# Patient Record
Sex: MALE | Race: WHITE
[De-identification: names, ages, dates, MRNs, and addresses within clinical notes are randomized per-mention and may not be internally consistent; named-entity substitution may affect disease eponyms.]

---

## 2023-10-06 ENCOUNTER — HOSPITAL ENCOUNTER (OUTPATIENT)
Dept: HOSPITAL 47 - ORWHC2ENDO | Age: 38
End: 2023-10-06
Attending: INTERNAL MEDICINE
Payer: COMMERCIAL

## 2023-10-06 VITALS — DIASTOLIC BLOOD PRESSURE: 96 MMHG | HEART RATE: 71 BPM | RESPIRATION RATE: 14 BRPM | SYSTOLIC BLOOD PRESSURE: 144 MMHG

## 2023-10-06 VITALS — TEMPERATURE: 97 F

## 2023-10-06 DIAGNOSIS — K21.9: ICD-10-CM

## 2023-10-06 DIAGNOSIS — D12.3: Primary | ICD-10-CM

## 2023-10-06 DIAGNOSIS — Z87.891: ICD-10-CM

## 2023-10-06 DIAGNOSIS — K29.50: ICD-10-CM

## 2023-10-06 DIAGNOSIS — K62.5: ICD-10-CM

## 2023-10-06 DIAGNOSIS — Z79.899: ICD-10-CM

## 2023-10-06 LAB
ALBUMIN SERPL-MCNC: 4.2 G/DL (ref 3.5–5)
ALP SERPL-CCNC: 78 U/L (ref 38–126)
ALT SERPL-CCNC: 43 U/L (ref 4–49)
ANION GAP SERPL CALC-SCNC: 10 MMOL/L
AST SERPL-CCNC: 26 U/L (ref 17–59)
BASOPHILS # BLD AUTO: 0 K/UL (ref 0–0.2)
BASOPHILS NFR BLD AUTO: 1 %
BUN SERPL-SCNC: 13 MG/DL (ref 9–20)
CALCIUM SPEC-MCNC: 9 MG/DL (ref 8.4–10.2)
CHLORIDE SERPL-SCNC: 101 MMOL/L (ref 98–107)
CHOLEST SERPL-MCNC: 246 MG/DL (ref 0–200)
CO2 SERPL-SCNC: 24 MMOL/L (ref 22–30)
EOSINOPHIL # BLD AUTO: 0.3 K/UL (ref 0–0.7)
EOSINOPHIL NFR BLD AUTO: 3 %
ERYTHROCYTE [DISTWIDTH] IN BLOOD BY AUTOMATED COUNT: 5.2 M/UL (ref 4.3–5.9)
ERYTHROCYTE [DISTWIDTH] IN BLOOD: 12.6 % (ref 11.5–15.5)
GLUCOSE SERPL-MCNC: 284 MG/DL (ref 74–99)
HCT VFR BLD AUTO: 45.3 % (ref 39–53)
HDLC SERPL-MCNC: 26 MG/DL (ref 40–60)
HGB BLD-MCNC: 16.2 GM/DL (ref 13–17.5)
LDLC SERPL CALC-MCNC: 120.8 MG/DL (ref 0–131)
LDLC SERPL DIRECT ASSAY-MCNC: 133 MG/DL (ref 0–129)
LYMPHOCYTES # SPEC AUTO: 2.2 K/UL (ref 1–4.8)
LYMPHOCYTES NFR SPEC AUTO: 26 %
MAGNESIUM SPEC-SCNC: 1.8 MG/DL (ref 1.6–2.3)
MCH RBC QN AUTO: 31.1 PG (ref 25–35)
MCHC RBC AUTO-ENTMCNC: 35.7 G/DL (ref 31–37)
MCV RBC AUTO: 87 FL (ref 80–100)
MONOCYTES # BLD AUTO: 0.6 K/UL (ref 0–1)
MONOCYTES NFR BLD AUTO: 7 %
NEUTROPHILS # BLD AUTO: 5.2 K/UL (ref 1.3–7.7)
NEUTROPHILS NFR BLD AUTO: 61 %
PLATELET # BLD AUTO: 186 K/UL (ref 150–450)
POTASSIUM SERPL-SCNC: 4 MMOL/L (ref 3.5–5.1)
PROT SERPL-MCNC: 7 G/DL (ref 6.3–8.2)
SODIUM SERPL-SCNC: 135 MMOL/L (ref 137–145)
VLDLC SERPL CALC-MCNC: 99.2 MG/DL (ref 5–40)
WBC # BLD AUTO: 8.6 K/UL (ref 3.8–10.6)

## 2023-10-06 PROCEDURE — 80061 LIPID PANEL: CPT

## 2023-10-06 PROCEDURE — 86803 HEPATITIS C AB TEST: CPT

## 2023-10-06 PROCEDURE — 88305 TISSUE EXAM BY PATHOLOGIST: CPT

## 2023-10-06 PROCEDURE — 85025 COMPLETE CBC W/AUTO DIFF WBC: CPT

## 2023-10-06 PROCEDURE — 43239 EGD BIOPSY SINGLE/MULTIPLE: CPT

## 2023-10-06 PROCEDURE — 80053 COMPREHEN METABOLIC PANEL: CPT

## 2023-10-06 PROCEDURE — 84443 ASSAY THYROID STIM HORMONE: CPT

## 2023-10-06 PROCEDURE — 83721 ASSAY OF BLOOD LIPOPROTEIN: CPT

## 2023-10-06 PROCEDURE — 88342 IMHCHEM/IMCYTCHM 1ST ANTB: CPT

## 2023-10-06 PROCEDURE — 83735 ASSAY OF MAGNESIUM: CPT

## 2023-10-06 PROCEDURE — 45380 COLONOSCOPY AND BIOPSY: CPT

## 2023-10-06 NOTE — P.PCN
Date of Procedure: 10/06/23


Procedure(s) Performed: 


Brief history:


Patient is a pleasant 38-year-old white male scheduled for an elective upper 

endoscopy as well as colonoscopy as a part of evaluation of GERD and 

intermittent rectal bleeding.





Procedure performed:


Esophagogastroduodenoscopy with biopsy


Colonoscopy with biopsy





Preoperative diagnosis:


GERD


Intermittent rectal bleeding





[place] [facility]





Procedure:


After informed consent was obtained from the patient  was brought into the 

endoscopy unit and IV  sedation was administered by anesthesia under continuous 

monitoring.  Initially upper endoscopy was done.  The Olympus  video 

endoscope was inserted inserted into the mouth and esophagus intubated without 

any difficulty and was gradually advanced into the stomach and duodenum and 

carefully examined.  The bulb and second part of the duodenum appeared normal.  

The scope was then withdrawn into the stomach adequately insufflated with air 

and upon careful examination  the antrum had diffuse gastritis and biopsies were

done from this area.  Mucosa of the  body, cardia and fundus appeared normal.  

The scope was then withdrawn into the esophagus.  The GE junction was located at

40 cm to the incisors.  It appeared irregular with no erythema erosions or 

ulcerations.  Rest of the esophagus appeared normal.  Patient tolerated the 

procedure well.





At this time the patient continued to remain sedation.  Initial digital rectal 

examination was normal.  Olympus  video colonoscope was then inserted into

the rectum and gradually advanced to the cecum without any difficulty.  Careful 

examination was performed as the scope was gradually being withdrawn.  The prep 

was excellent.  The cecum, ascending colon, appeared normal.  The transverse 

colon there was a 3 mm and 5 mm polyp that was removed by cold biopsy.  Rest of 

the transverse colon, descending colon, sigmoid colon and rectum appeared 

normal.  Retroflexion was performed in the rectum and no lesions were noted.  

Patient tolerated the procedure well.





Impression:


1.  Upper endoscopy revealed diffuse antral gastritis but no evidence of 

esophagitis or Fowler's esophagus


2.  Colonoscopy revealed 3 mm and 5 mm transverse colon polyp status post cold 

biopsy








Recommendations:


Findings of this examination were discussed with the patient as well as his 

family.  He was advised to follow with the biopsy results.  Continue with a 40 

Mg Daily and Follow Antireflux Measures.  If the biopsy of colon polyps revealed

adenoma , he can have a repeat colonoscopy in 5 years.